# Patient Record
(demographics unavailable — no encounter records)

---

## 2025-04-03 NOTE — DISCUSSION/SUMMARY
[Normal Growth] : growth [No Feeding Concerns] : feeding [Normal Sleep Pattern] : sleep [Delayed Social Skills] : delayed social skills [Delayed Language Skills] : delayed language skills [Nutrition and Physical Activity] : nutrition and physical activity [Oral Health] : oral health [Mother] : mother [de-identified] : pull ups at night [FreeTextEntry1] : Discussed components of 5-2-1-0, healthy active living with patient and family.  Recommended 5 servings of fruits and vegetables per day, less than 2 hours of screen time per day, 1 hour of exercise per day, and ZERO sugar sweetened beverages. bloodwork ordered.

## 2025-04-03 NOTE — HISTORY OF PRESENT ILLNESS
[Father] : father [Normal] : Normal [Brushing teeth twice/d] : brushing teeth twice per day [No] : Patient does not go to dentist yearly [Special Education] : special education  [Up to date] : Up to date [FreeTextEntry7] : seen [facility] at [facility] dx'd w/strep swab done, went in bc of rash on face and R thigh. started on Zithro, next day hives, went back to [facility] given Bactrim [de-identified] : Dr. Cruz will be doing on site observation and neuropsych.evaluation. was approved for [facility] want to try swimming again, horseback riding therapy. [de-identified] : palate is good, eating well, drinks only water. dairy from cheese [FreeTextEntry8] : pull ups at night 50/50 wet/dry at night [FreeTextEntry3] : needs mom to be in bed to go to bed, OCD, fear of dark. once asleep stays asleep [de-identified] : hasn't been since one year old, does brush teeth [de-identified] : 8:4:1 [facility].has dedicated [facility] and RBT, has ongoing case on appeal  [NO] : No

## 2025-04-03 NOTE — PHYSICAL EXAM
[Alert] : alert [No Acute Distress] : no acute distress [Normocephalic] : normocephalic [Conjunctivae with no discharge] : conjunctivae with no discharge [EOMI Bilateral] : EOMI bilateral [Auricles Well Formed] : auricles well formed [Clear Tympanic membranes with present light reflex and bony landmarks] : clear tympanic membranes with present light reflex and bony landmarks [No Discharge] : no discharge [Nares Patent] : nares patent [Palate Intact] : palate intact [Nonerythematous Oropharynx] : nonerythematous oropharynx [Supple, full passive range of motion] : supple, full passive range of motion [No Palpable Masses] : no palpable masses [Symmetric Chest Rise] : symmetric chest rise [Clear to Auscultation Bilaterally] : clear to auscultation bilaterally [Regular Rate and Rhythm] : regular rate and rhythm [Normal S1, S2 present] : normal S1, S2 present [No Murmurs] : no murmurs [+2 Femoral Pulses] : +2 femoral pulses [Soft] : soft [NonTender] : non tender [Non Distended] : non distended [Normoactive Bowel Sounds] : normoactive bowel sounds [No Hepatomegaly] : no hepatomegaly [No Splenomegaly] : no splenomegaly [Testicles Descended Bilaterally] : testicles descended bilaterally [Patent] : patent [No Abnormal Lymph Nodes Palpated] : no abnormal lymph nodes palpated [No Gait Asymmetry] : no gait asymmetry [No pain or deformities with palpation of bone, muscles, joints] : no pain or deformities with palpation of bone, muscles, joints [Normal Muscle Tone] : normal muscle tone [Straight] : straight [Cranial Nerves Grossly Intact] : cranial nerves grossly intact [No Rash or Lesions] : no rash or lesions